# Patient Record
Sex: MALE | Race: WHITE | Employment: UNEMPLOYED | URBAN - METROPOLITAN AREA
[De-identification: names, ages, dates, MRNs, and addresses within clinical notes are randomized per-mention and may not be internally consistent; named-entity substitution may affect disease eponyms.]

---

## 2018-09-12 ENCOUNTER — HOSPITAL ENCOUNTER (EMERGENCY)
Age: 48
Discharge: LEFT BEFORE DISCHARGE | End: 2018-09-12
Payer: MEDICAID

## 2018-09-12 VITALS
OXYGEN SATURATION: 98 % | HEIGHT: 72 IN | DIASTOLIC BLOOD PRESSURE: 90 MMHG | BODY MASS INDEX: 24.38 KG/M2 | RESPIRATION RATE: 18 BRPM | SYSTOLIC BLOOD PRESSURE: 147 MMHG | WEIGHT: 180 LBS | TEMPERATURE: 98.2 F | HEART RATE: 94 BPM

## 2018-09-12 ASSESSMENT — PAIN DESCRIPTION - LOCATION: LOCATION: BACK;LEG

## 2018-09-12 ASSESSMENT — PAIN DESCRIPTION - FREQUENCY: FREQUENCY: CONTINUOUS

## 2018-09-12 ASSESSMENT — PAIN DESCRIPTION - PAIN TYPE: TYPE: ACUTE PAIN

## 2018-09-12 ASSESSMENT — PAIN DESCRIPTION - DESCRIPTORS: DESCRIPTORS: SHARP;SHOOTING;STABBING

## 2018-09-12 ASSESSMENT — PAIN SCALES - GENERAL: PAINLEVEL_OUTOF10: 8

## 2018-09-18 ENCOUNTER — HOSPITAL ENCOUNTER (EMERGENCY)
Age: 48
Discharge: HOME | End: 2018-09-19
Attending: EMERGENCY MEDICINE
Payer: MEDICAID

## 2018-09-18 DIAGNOSIS — M54.41 LOW BACK PAIN WITH RIGHT-SIDED SCIATICA, UNSPECIFIED BACK PAIN LATERALITY, UNSPECIFIED CHRONICITY: Primary | ICD-10-CM

## 2018-09-18 LAB
-: NORMAL
AMORPHOUS: NORMAL
ANION GAP SERPL CALCULATED.3IONS-SCNC: 10 MMOL/L (ref 9–17)
BACTERIA: NORMAL
BILIRUBIN URINE: NEGATIVE
BUN BLDV-MCNC: 20 MG/DL (ref 6–20)
BUN/CREAT BLD: ABNORMAL (ref 9–20)
C-REACTIVE PROTEIN: 0.8 MG/L (ref 0–5)
CALCIUM SERPL-MCNC: 8.8 MG/DL (ref 8.6–10.4)
CASTS UA: NORMAL /LPF (ref 0–8)
CHLORIDE BLD-SCNC: 104 MMOL/L (ref 98–107)
CO2: 27 MMOL/L (ref 20–31)
COLOR: YELLOW
CREAT SERPL-MCNC: 0.6 MG/DL (ref 0.7–1.2)
CRYSTALS, UA: NORMAL /HPF
EPITHELIAL CELLS UA: NORMAL /HPF (ref 0–5)
GFR AFRICAN AMERICAN: >60 ML/MIN
GFR NON-AFRICAN AMERICAN: >60 ML/MIN
GFR SERPL CREATININE-BSD FRML MDRD: ABNORMAL ML/MIN/{1.73_M2}
GFR SERPL CREATININE-BSD FRML MDRD: ABNORMAL ML/MIN/{1.73_M2}
GLUCOSE BLD-MCNC: 99 MG/DL (ref 70–99)
GLUCOSE URINE: NEGATIVE
HCT VFR BLD CALC: 39.6 % (ref 40.7–50.3)
HEMOGLOBIN: 13.2 G/DL (ref 13–17)
KETONES, URINE: NEGATIVE
LACTIC ACID, WHOLE BLOOD: 1.2 MMOL/L (ref 0.7–2.1)
LEUKOCYTE ESTERASE, URINE: NEGATIVE
MCH RBC QN AUTO: 35.1 PG (ref 25.2–33.5)
MCHC RBC AUTO-ENTMCNC: 33.3 G/DL (ref 28.4–34.8)
MCV RBC AUTO: 105.3 FL (ref 82.6–102.9)
MUCUS: NORMAL
NITRITE, URINE: NEGATIVE
NRBC AUTOMATED: 0 PER 100 WBC
OTHER OBSERVATIONS UA: NORMAL
PDW BLD-RTO: 15.1 % (ref 11.8–14.4)
PH UA: 7 (ref 5–8)
PLATELET # BLD: 144 K/UL (ref 138–453)
PMV BLD AUTO: 10.1 FL (ref 8.1–13.5)
POTASSIUM SERPL-SCNC: 4.2 MMOL/L (ref 3.7–5.3)
PROTEIN UA: NEGATIVE
RBC # BLD: 3.76 M/UL (ref 4.21–5.77)
RBC UA: NORMAL /HPF (ref 0–4)
RENAL EPITHELIAL, UA: NORMAL /HPF
SODIUM BLD-SCNC: 141 MMOL/L (ref 135–144)
SPECIFIC GRAVITY UA: 1.02 (ref 1–1.03)
TRICHOMONAS: NORMAL
TURBIDITY: CLEAR
URINE HGB: NEGATIVE
UROBILINOGEN, URINE: NORMAL
WBC # BLD: 8.9 K/UL (ref 3.5–11.3)
WBC UA: NORMAL /HPF (ref 0–5)
YEAST: NORMAL

## 2018-09-18 PROCEDURE — 96374 THER/PROPH/DIAG INJ IV PUSH: CPT

## 2018-09-18 PROCEDURE — 86140 C-REACTIVE PROTEIN: CPT

## 2018-09-18 PROCEDURE — 85651 RBC SED RATE NONAUTOMATED: CPT

## 2018-09-18 PROCEDURE — 83605 ASSAY OF LACTIC ACID: CPT

## 2018-09-18 PROCEDURE — 87086 URINE CULTURE/COLONY COUNT: CPT

## 2018-09-18 PROCEDURE — 6360000002 HC RX W HCPCS: Performed by: EMERGENCY MEDICINE

## 2018-09-18 PROCEDURE — 81001 URINALYSIS AUTO W/SCOPE: CPT

## 2018-09-18 PROCEDURE — 80048 BASIC METABOLIC PNL TOTAL CA: CPT

## 2018-09-18 PROCEDURE — 99285 EMERGENCY DEPT VISIT HI MDM: CPT

## 2018-09-18 PROCEDURE — 85027 COMPLETE CBC AUTOMATED: CPT

## 2018-09-18 RX ORDER — MORPHINE SULFATE 4 MG/ML
4 INJECTION, SOLUTION INTRAMUSCULAR; INTRAVENOUS ONCE
Status: COMPLETED | OUTPATIENT
Start: 2018-09-18 | End: 2018-09-18

## 2018-09-18 RX ADMIN — MORPHINE SULFATE 4 MG: 4 INJECTION, SOLUTION INTRAMUSCULAR; INTRAVENOUS at 22:46

## 2018-09-18 ASSESSMENT — PAIN SCALES - GENERAL: PAINLEVEL_OUTOF10: 10

## 2018-09-18 ASSESSMENT — PAIN DESCRIPTION - LOCATION: LOCATION: BACK

## 2018-09-18 ASSESSMENT — PAIN DESCRIPTION - FREQUENCY: FREQUENCY: CONTINUOUS

## 2018-09-18 ASSESSMENT — PAIN DESCRIPTION - PAIN TYPE: TYPE: ACUTE PAIN

## 2018-09-18 ASSESSMENT — PAIN DESCRIPTION - ONSET: ONSET: SUDDEN

## 2018-09-18 ASSESSMENT — PAIN DESCRIPTION - DESCRIPTORS: DESCRIPTORS: ACHING;SHARP;SHOOTING

## 2018-09-19 ENCOUNTER — APPOINTMENT (OUTPATIENT)
Dept: MRI IMAGING | Age: 48
End: 2018-09-19
Payer: MEDICAID

## 2018-09-19 VITALS
BODY MASS INDEX: 25.19 KG/M2 | TEMPERATURE: 98.8 F | OXYGEN SATURATION: 98 % | HEIGHT: 72 IN | WEIGHT: 186 LBS | RESPIRATION RATE: 16 BRPM | DIASTOLIC BLOOD PRESSURE: 71 MMHG | SYSTOLIC BLOOD PRESSURE: 119 MMHG | HEART RATE: 73 BPM

## 2018-09-19 LAB
CULTURE: NO GROWTH
Lab: NORMAL
SEDIMENTATION RATE, ERYTHROCYTE: 7 MM (ref 0–10)
SPECIMEN DESCRIPTION: NORMAL
STATUS: NORMAL

## 2018-09-19 PROCEDURE — 96375 TX/PRO/DX INJ NEW DRUG ADDON: CPT

## 2018-09-19 PROCEDURE — 72157 MRI CHEST SPINE W/O & W/DYE: CPT

## 2018-09-19 PROCEDURE — 96376 TX/PRO/DX INJ SAME DRUG ADON: CPT

## 2018-09-19 PROCEDURE — 6360000004 HC RX CONTRAST MEDICATION: Performed by: EMERGENCY MEDICINE

## 2018-09-19 PROCEDURE — 72156 MRI NECK SPINE W/O & W/DYE: CPT

## 2018-09-19 PROCEDURE — 6360000002 HC RX W HCPCS: Performed by: EMERGENCY MEDICINE

## 2018-09-19 PROCEDURE — A9576 INJ PROHANCE MULTIPACK: HCPCS | Performed by: EMERGENCY MEDICINE

## 2018-09-19 PROCEDURE — 72158 MRI LUMBAR SPINE W/O & W/DYE: CPT

## 2018-09-19 RX ORDER — QUETIAPINE FUMARATE 100 MG/1
100 TABLET, FILM COATED ORAL 2 TIMES DAILY
COMMUNITY

## 2018-09-19 RX ORDER — MORPHINE SULFATE 4 MG/ML
4 INJECTION, SOLUTION INTRAMUSCULAR; INTRAVENOUS ONCE
Status: COMPLETED | OUTPATIENT
Start: 2018-09-19 | End: 2018-09-19

## 2018-09-19 RX ORDER — QUETIAPINE FUMARATE 300 MG/1
300 TABLET, FILM COATED ORAL NIGHTLY
COMMUNITY

## 2018-09-19 RX ORDER — NAPROXEN 500 MG/1
500 TABLET ORAL 2 TIMES DAILY WITH MEALS
Qty: 60 TABLET | Refills: 0 | Status: SHIPPED | OUTPATIENT
Start: 2018-09-19 | End: 2018-10-04

## 2018-09-19 RX ADMIN — GADOTERIDOL 16 ML: 279.3 INJECTION, SOLUTION INTRAVENOUS at 08:02

## 2018-09-19 RX ADMIN — MORPHINE SULFATE 4 MG: 4 INJECTION, SOLUTION INTRAMUSCULAR; INTRAVENOUS at 05:49

## 2018-09-19 ASSESSMENT — ENCOUNTER SYMPTOMS
VOMITING: 0
NAUSEA: 0
SHORTNESS OF BREATH: 0
EYE DISCHARGE: 0
SORE THROAT: 0
RHINORRHEA: 0
COUGH: 0
EYE REDNESS: 0
BLOOD IN STOOL: 0
DIARRHEA: 0
ABDOMINAL PAIN: 0
BACK PAIN: 1
CHEST TIGHTNESS: 0

## 2018-09-19 ASSESSMENT — PAIN DESCRIPTION - DESCRIPTORS
DESCRIPTORS: ACHING;SHOOTING
DESCRIPTORS: ACHING

## 2018-09-19 ASSESSMENT — PAIN SCALES - GENERAL
PAINLEVEL_OUTOF10: 5
PAINLEVEL_OUTOF10: 6

## 2018-09-19 ASSESSMENT — PAIN DESCRIPTION - ORIENTATION: ORIENTATION: MID

## 2018-09-19 ASSESSMENT — PAIN DESCRIPTION - ONSET: ONSET: ON-GOING

## 2018-09-19 ASSESSMENT — PAIN DESCRIPTION - PAIN TYPE: TYPE: CHRONIC PAIN

## 2018-09-19 ASSESSMENT — PAIN - FUNCTIONAL ASSESSMENT: PAIN_FUNCTIONAL_ASSESSMENT: 0-10

## 2018-09-19 ASSESSMENT — PAIN DESCRIPTION - LOCATION: LOCATION: BACK

## 2018-09-19 ASSESSMENT — PAIN DESCRIPTION - FREQUENCY: FREQUENCY: CONTINUOUS

## 2018-09-19 NOTE — ED PROVIDER NOTES
[address] ED  Emergency Department  Emergency Medicine Resident Sign-out     Care of Georgi Hilton was assumed from Dr. Bill Au and is being seen for Shoulder Pain (LEft shoulder pain. States it burns when he moves it.); Back Pain (Chronic back pain. Long Hx of disc problems.); Incontinence (States he notices slightly wet underwear and that this is new to him. Does not know it happened till it happens); and Suicidal  .  The patient's initial evaluation and plan have been discussed with the prior provider who initially evaluated the patient. EMERGENCY DEPARTMENT COURSE / MEDICAL DECISION MAKING:       MEDICATIONS GIVEN:  Orders Placed This Encounter   Medications    morphine (PF) injection 4 mg       LABS / RADIOLOGY:     Labs Reviewed   CBC - Abnormal; Notable for the following:        Result Value    RBC 3.76 (*)     Hematocrit 39.6 (*)     .3 (*)     MCH 35.1 (*)     RDW 15.1 (*)     All other components within normal limits   BASIC METABOLIC PANEL - Abnormal; Notable for the following:     CREATININE 0.60 (*)     All other components within normal limits   URINE CULTURE   C-REACTIVE PROTEIN   SEDIMENTATION RATE   LACTIC ACID, WHOLE BLOOD   URINALYSIS WITH MICROSCOPIC       No results found. RECENT VITALS:     Temp: 97.9 °F (36.6 °C),  Pulse: 80, Resp: 17, BP: (!) 170/93, SpO2: 97 %    This patient is a 46 y.o. Male with New onset urinary incontinence, worsening lumbar back pain, worsening right lower extremity weakness. History of IV drug abuse. States has not used in 6 months. Denies fevers, inflammatory workup obtained as well as MRI cervical thoracic and lumbar spine. Suicidal due to pain. Social work. OUTSTANDING TASKS / RECOMMENDATIONS:    1. Await MRIs versus admit to observation unit - patient signed out to Dr. Shanita Blanc, awaiting MRI's     FINAL IMPRESSION:     1.  Suicidal ideation         DISPOSITION:         DISPOSITION: pending  []  Discharge   []  Transfer - []  Admission -     []  Against Medical Advice   []  Eloped   FOLLOW-UP: No follow-up provider specified.    DISCHARGE MEDICATIONS: New Prescriptions    No medications on file          Tera Duarte MD  Emergency Medicine Resident  [address] [facility]        Tera Duarte MD  Resident  09/19/18 8880

## 2018-09-19 NOTE — ED PROVIDER NOTES
were used in the gender appropriate to the circumstances; and whenever words are used in this note in the singular or plural form, they shall be construed as though they were used in the form appropriate to the circumstances.)    Bhupinder Islas MD [facility]  Attending Emergency Medicine Physician           Ye Jerome MD  09/18/18 2123

## 2018-09-19 NOTE — ED PROVIDER NOTES
FACULTY SIGN-OUT  ADDENDUM     Care of Canelo Krishnamurthy was assumed from Dr. Bhavin Harris and is being seen for Shoulder Pain (LEft shoulder pain. States it burns when he moves it.); Back Pain (Chronic back pain. Long Hx of disc problems.); Incontinence (States he notices slightly wet underwear and that this is new to him. Does not know it happened till it happens); and Suicidal  .  The patient's initial evaluation and plan have been discussed with the prior provider who initially evaluated the patient. ED COURSE      The patient was given the following medications while in the emergency department:      RECENT VITALS:   Temp: 97.2 °F (36.2 °C),  Pulse: 76, Resp: 16, BP: (!) 142/87    MEDICAL DECISION MAKING       This patient is a 46 y.o. Male. Back pain  Patient reports she has a history of worsening back pain, 2 prior back surgeries, neurosurgeons down in [address] [place] [address] wanted to do with third the patient declined it. He reports worsening back pain, no inciting trauma that he can recall, he does report he is having some episodes of incontinence, as well as some numbness and tingling down the lateral aspect of his right leg. MRI of spine was done. And NS to be consulted due to symptoms, and some stenosis, and disc buldges noted. Patient initially had said he was SI, but was frustrated with pain at that time, he denies SI or HI at this time, no intent,   H/o depression and anxiety, but controlled    Lorena Chairez  Emergency Medicine Attending          Ubaldo Pritchett, DO  09/19/18 1002    10:30 AM  Spoke with Angelo Perez with NS who will be down to eval the patient      11:40 AM  Spoke with NS PA and patient is okay for d/c with outpatient follow up. Patient updated on plan, he continues to deny SI, to myself and nursing.      Ubaldo Pritchett, DO  09/19/18 9725

## 2018-09-19 NOTE — ED PROVIDER NOTES
Dr Jose D Cuevas sign out suicidal & back pain wbc stable, mri pending, will determine disposition. Uri Crow,   09/18/18 3825    Care turned over to day shift.         Uri Crow,   09/19/18 9083

## 2018-09-19 NOTE — ED NOTES
Pt provided with geno mist. Pt in NAD. Awaiting MRI. Will continue to monitor.       Daniel Dias RN  09/18/18 9192
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
## 2018-09-19 NOTE — ED NOTES
Extensive discussion with pt,  Patient denies suicidal, stating \"I never really was, I just said that because I was frustrated. \"  I just was having so much pain \"i just wanted to go to sleep and not wake up, Just to get out of pain, I don't really want to do that\"   Stating \"i just want to get back to [employer], so I can do the things I need to do, like work there\"    Patient pleasant and cooperative, stating he is much better now that he had inpatient stay in [place] and they put him back on some meds. Has medications to take, and home med list updated with dose and meds as given per pt for history of which meds he is taking.       Derrek Hilton RN  09/19/18 2087

## 2018-09-19 NOTE — ED NOTES
Pt wlak into ER w/ slow steady gait favoring right side c/o hearing a \"pop\" while twisting torso while moving something earlier today. Pt has extensive back problems and states baseline aches and pains that have now increased. Pt arrives guarding grimacing. Pt into room 26 for initial encounter, upon which he states he is currently suicidal due to the ongoing pain w/out relief, stating no specific plan but if he did he would \"blow my brains out\". Pt in NAD and rr tavo nand unlabored, aox4, and calm and cooperative. Pt states psych hx. Will continue to monitor. Pt called in as SI watch and mvoed to [facility] AN AFFILIATE OF [facility] brown/ lisa @ bedside.      Carlotta Santamaria RN  09/18/18 2056

## 2018-09-19 NOTE — ED PROVIDER NOTES
 Sexual activity: Not on file     Other Topics Concern    Not on file     Social History Narrative    No narrative on file       History reviewed. No pertinent family history. Allergies:  Patient has no known allergies. Home Medications:  Prior to Admission medications    Not on File       REVIEW OF SYSTEMS    (2-9 systems for level 4, 10 or more for level 5)      Review of Systems   Constitutional: Negative for chills and fever. HENT: Negative for congestion, rhinorrhea and sore throat. Eyes: Negative for discharge and redness. Respiratory: Negative for cough, chest tightness and shortness of breath. Cardiovascular: Negative for chest pain. Gastrointestinal: Negative for abdominal pain, blood in stool, diarrhea, nausea and vomiting. Endocrine: Negative for polydipsia and polyuria. Genitourinary: Negative for dysuria and hematuria. Urinary incontinence   Musculoskeletal: Positive for back pain. Negative for neck pain and neck stiffness. Skin: Negative for rash and wound. Allergic/Immunologic: Negative for immunocompromised state. Neurological: Positive for weakness. Negative for numbness and headaches. Hematological: Negative for adenopathy. Psychiatric/Behavioral: Negative for agitation and behavioral problems. PHYSICAL EXAM   (up to 7 for level 4, 8 or more for level 5)      INITIAL VITALS:   BP (!) 170/93   Pulse 80   Temp 97.9 °F (36.6 °C) (Oral)   Resp 17   Ht 6' (1.829 m)   Wt 186 lb (84.4 kg)   SpO2 97%   BMI 25.23 kg/m²     Physical Exam   Constitutional: He is oriented to person, place, and time. He appears well-developed and well-nourished. No distress. Nontoxic-appearing, afebrile, non-tachycardic   HENT:   Head: Normocephalic and atraumatic. Eyes: Pupils are equal, round, and reactive to light. Conjunctivae and EOM are normal.   Neck: Normal range of motion. Neck supple. No JVD present. No tracheal deviation present.    No midline cervical 14.4 %    Platelets 534 818 - 739 k/uL    MPV 10.1 8.1 - 13.5 fL    NRBC Automated 0.0 0.0 per 100 WBC   BASIC METABOLIC PANEL   Result Value Ref Range    Glucose 99 70 - 99 mg/dL    BUN 20 6 - 20 mg/dL    CREATININE 0.60 (L) 0.70 - 1.20 mg/dL    Bun/Cre Ratio NOT REPORTED 9 - 20    Calcium 8.8 8.6 - 10.4 mg/dL    Sodium 141 135 - 144 mmol/L    Potassium 4.2 3.7 - 5.3 mmol/L    Chloride 104 98 - 107 mmol/L    CO2 27 20 - 31 mmol/L    Anion Gap 10 9 - 17 mmol/L    GFR Non-African American >60 >60 mL/min    GFR African American >60 >60 mL/min    GFR Comment          GFR Staging NOT REPORTED    C-REACTIVE PROTEIN   Result Value Ref Range    CRP 0.8 0.0 - 5.0 mg/L   Sedimentation Rate   Result Value Ref Range    Sed Rate 7 0 - 10 mm   Lactic Acid, Whole Blood   Result Value Ref Range    Lactic Acid, Whole Blood 1.2 0.7 - 2.1 mmol/L   Urinalysis with Microscopic   Result Value Ref Range    Color, UA YELLOW YEL    Turbidity UA CLEAR CLEAR    Glucose, Ur NEGATIVE NEG    Bilirubin Urine NEGATIVE NEG    Ketones, Urine NEGATIVE NEG    Specific [place], UA 1.021 1.005 - 1.030    Urine Hgb NEGATIVE NEG    pH, UA 7.0 5.0 - 8.0    Protein, UA NEGATIVE NEG    Urobilinogen, Urine Normal NORM    Nitrite, Urine NEGATIVE NEG    Leukocyte Esterase, Urine NEGATIVE NEG    -          WBC, UA 0 TO 2 0 - 5 /HPF    RBC, UA None 0 - 4 /HPF    Casts UA NOT REPORTED 0 - 8 /LPF    Crystals UA NOT REPORTED NONE /HPF    Epithelial Cells UA 0 TO 2 0 - 5 /HPF    Renal Epithelial, Urine NOT REPORTED 0 /HPF    Bacteria, UA NOT REPORTED NONE    Mucus, UA NOT REPORTED NONE    Trichomonas, UA NOT REPORTED NONE    Amorphous, UA NOT REPORTED NONE    Other Observations UA NOT REPORTED NREQ    Yeast, UA NOT REPORTED NONE       IMPRESSION/EMERGENCY DEPARTMENT COURSE:    Plan is to obtain basic laboratory workup including inflammatory markers, obtain MRI of the cervical/thoracic/lumbar spine and reevaluate.   Pending imaging results possible neurosurgery

## 2018-09-19 NOTE — ED NOTES
Patient going to MRI with Security. Will be medicated for pain. MRI tech sts exam will take 2.5 hours.      Juan Alberto De Dios RN  09/19/18 6730

## 2018-09-19 NOTE — PROCEDURES
Just received mri checklist . No staff available to transport patient and stay for the duration of exam. Please call MRI department when staff is made available.